# Patient Record
Sex: FEMALE | Race: WHITE | NOT HISPANIC OR LATINO | Employment: PART TIME | URBAN - METROPOLITAN AREA
[De-identification: names, ages, dates, MRNs, and addresses within clinical notes are randomized per-mention and may not be internally consistent; named-entity substitution may affect disease eponyms.]

---

## 2017-01-17 ENCOUNTER — GENERIC CONVERSION - ENCOUNTER (OUTPATIENT)
Dept: OTHER | Facility: OTHER | Age: 47
End: 2017-01-17

## 2017-01-17 ENCOUNTER — ALLSCRIPTS OFFICE VISIT (OUTPATIENT)
Dept: OTHER | Facility: OTHER | Age: 47
End: 2017-01-17

## 2017-01-17 LAB — S PYO AG THROAT QL: NEGATIVE

## 2017-02-06 ENCOUNTER — ALLSCRIPTS OFFICE VISIT (OUTPATIENT)
Dept: OTHER | Facility: OTHER | Age: 47
End: 2017-02-06

## 2017-02-06 DIAGNOSIS — E66.9 OBESITY: ICD-10-CM

## 2017-02-17 ENCOUNTER — LAB CONVERSION - ENCOUNTER (OUTPATIENT)
Dept: OTHER | Facility: OTHER | Age: 47
End: 2017-02-17

## 2017-02-17 LAB
CHOLEST SERPL-MCNC: 176 MG/DL (ref 125–200)
CHOLEST/HDLC SERPL: 2.6 (CALC)
HDLC SERPL-MCNC: 68 MG/DL
LDL CHOLESTEROL (HISTORICAL): 97 MG/DL (CALC)
NON-HDL-CHOL (CHOL-HDL) (HISTORICAL): 108 MG/DL (CALC)
TRIGL SERPL-MCNC: 57 MG/DL

## 2017-02-18 ENCOUNTER — LAB CONVERSION - ENCOUNTER (OUTPATIENT)
Dept: OTHER | Facility: OTHER | Age: 47
End: 2017-02-18

## 2017-02-18 LAB
25(OH)D3 SERPL-MCNC: 23 NG/ML (ref 30–100)
A/G RATIO (HISTORICAL): 1.7 (CALC) (ref 1–2.5)
ALBUMIN SERPL BCP-MCNC: 4.2 G/DL (ref 3.6–5.1)
ALP SERPL-CCNC: 43 U/L (ref 33–115)
ALT SERPL W P-5'-P-CCNC: 14 U/L (ref 6–29)
AST SERPL W P-5'-P-CCNC: 14 U/L (ref 10–35)
BACTERIA UR QL AUTO: ABNORMAL /HPF
BASOPHILS # BLD AUTO: 0.4 %
BASOPHILS # BLD AUTO: 23 CELLS/UL (ref 0–200)
BILIRUB SERPL-MCNC: 0.8 MG/DL (ref 0.2–1.2)
BILIRUB UR QL STRIP: NEGATIVE
BUN SERPL-MCNC: 16 MG/DL (ref 7–25)
BUN/CREA RATIO (HISTORICAL): NORMAL (CALC) (ref 6–22)
CALCIUM SERPL-MCNC: 8.9 MG/DL (ref 8.6–10.2)
CHLORIDE SERPL-SCNC: 105 MMOL/L (ref 98–110)
CHOLEST SERPL-MCNC: 176 MG/DL (ref 125–200)
CHOLEST/HDLC SERPL: 2.6 (CALC)
CO2 SERPL-SCNC: 25 MMOL/L (ref 20–31)
COLOR UR: YELLOW
COMMENT (HISTORICAL): ABNORMAL
CREAT SERPL-MCNC: 1.02 MG/DL (ref 0.5–1.1)
DEPRECATED RDW RBC AUTO: 14.5 % (ref 11–15)
EGFR AFRICAN AMERICAN (HISTORICAL): 75 ML/MIN/1.73M2
EGFR-AMERICAN CALC (HISTORICAL): 65 ML/MIN/1.73M2
EOSINOPHIL # BLD AUTO: 1.2 %
EOSINOPHIL # BLD AUTO: 68 CELLS/UL (ref 15–500)
FECAL OCCULT BLOOD DIAGNOSTIC (HISTORICAL): NEGATIVE
GAMMA GLOBULIN (HISTORICAL): 2.5 G/DL (CALC) (ref 1.9–3.7)
GLUCOSE (HISTORICAL): 85 MG/DL (ref 65–99)
GLUCOSE (HISTORICAL): NEGATIVE
HCT VFR BLD AUTO: 39.8 % (ref 35–45)
HDLC SERPL-MCNC: 68 MG/DL
HGB BLD-MCNC: 13.1 G/DL (ref 11.7–15.5)
HYALINE CASTS #/AREA URNS LPF: ABNORMAL /LPF
KETONES UR STRIP-MCNC: NEGATIVE MG/DL
LDL CHOLESTEROL (HISTORICAL): 97 MG/DL (CALC)
LEUKOCYTE ESTERASE UR QL STRIP: ABNORMAL
LYMPHOCYTES # BLD AUTO: 1676 CELLS/UL (ref 850–3900)
LYMPHOCYTES # BLD AUTO: 29.4 %
MCH RBC QN AUTO: 29.6 PG (ref 27–33)
MCHC RBC AUTO-ENTMCNC: 32.8 G/DL (ref 32–36)
MCV RBC AUTO: 90.2 FL (ref 80–100)
MONOCYTES # BLD AUTO: 399 CELLS/UL (ref 200–950)
MONOCYTES (HISTORICAL): 7 %
NEUTROPHILS # BLD AUTO: 3534 CELLS/UL (ref 1500–7800)
NEUTROPHILS # BLD AUTO: 62 %
NITRITE UR QL STRIP: NEGATIVE
NON-HDL-CHOL (CHOL-HDL) (HISTORICAL): 108 MG/DL (CALC)
PH UR STRIP.AUTO: 6 [PH] (ref 5–8)
PLATELET # BLD AUTO: 209 THOUSAND/UL (ref 140–400)
PMV BLD AUTO: 9.8 FL (ref 7.5–12.5)
POTASSIUM SERPL-SCNC: 4.4 MMOL/L (ref 3.5–5.3)
PROT UR STRIP-MCNC: NEGATIVE MG/DL
RBC # BLD AUTO: 4.41 MILLION/UL (ref 3.8–5.1)
RBC (HISTORICAL): ABNORMAL /HPF
SODIUM SERPL-SCNC: 137 MMOL/L (ref 135–146)
SP GR UR STRIP.AUTO: 1.03 (ref 1–1.03)
SQUAMOUS EPITHELIAL CELLS (HISTORICAL): ABNORMAL /HPF
TOTAL PROTEIN (HISTORICAL): 6.7 G/DL (ref 6.1–8.1)
TRIGL SERPL-MCNC: 57 MG/DL
TSH SERPL DL<=0.05 MIU/L-ACNC: 2.69 MIU/L
WBC # BLD AUTO: 5.7 THOUSAND/UL (ref 3.8–10.8)
WBC # BLD AUTO: ABNORMAL /HPF

## 2017-08-15 ENCOUNTER — ALLSCRIPTS OFFICE VISIT (OUTPATIENT)
Dept: OTHER | Facility: OTHER | Age: 47
End: 2017-08-15

## 2017-08-15 ENCOUNTER — APPOINTMENT (OUTPATIENT)
Dept: LAB | Age: 47
End: 2017-08-15
Payer: COMMERCIAL

## 2017-08-15 ENCOUNTER — TRANSCRIBE ORDERS (OUTPATIENT)
Dept: ADMINISTRATIVE | Age: 47
End: 2017-08-15

## 2017-08-15 DIAGNOSIS — Z02.1 ENCOUNTER FOR PRE-EMPLOYMENT EXAMINATION: ICD-10-CM

## 2017-08-15 DIAGNOSIS — Z13.220 ENCOUNTER FOR SCREENING FOR LIPOID DISORDERS: ICD-10-CM

## 2017-08-15 DIAGNOSIS — E55.9 VITAMIN D DEFICIENCY: ICD-10-CM

## 2017-08-15 PROCEDURE — 86480 TB TEST CELL IMMUN MEASURE: CPT

## 2017-08-15 PROCEDURE — 36415 COLL VENOUS BLD VENIPUNCTURE: CPT

## 2017-08-17 LAB
ANNOTATION COMMENT IMP: NORMAL
GAMMA INTERFERON BACKGROUND BLD IA-ACNC: 0.03 IU/ML
M TB IFN-G BLD-IMP: NEGATIVE
M TB IFN-G CD4+ BCKGRND COR BLD-ACNC: 0 IU/ML
M TB IFN-G CD4+ T-CELLS BLD-ACNC: 0.03 IU/ML
MITOGEN IGNF BLD-ACNC: >10 IU/ML
QUANTIFERON-TB GOLD IN TUBE: NORMAL
SERVICE CMNT-IMP: NORMAL

## 2017-08-18 ENCOUNTER — GENERIC CONVERSION - ENCOUNTER (OUTPATIENT)
Dept: OTHER | Facility: OTHER | Age: 47
End: 2017-08-18

## 2018-01-13 NOTE — MISCELLANEOUS
Message   Date: 18 Aug 2017 12:26 PM EST, Recorded By: Sawyer Harrington For: Neena Mcghee left on [phone]   that employment physical form is completed and ready for  in the [facility] office  Active Problems    1  Encounter for physical examination related to employment (V70 5) (Z02 1)   2  Lipid screening (V77 91) (Z13 220)   3  Obese (278 00) (E66 9)   4  Seasonal allergies (477 9) (J30 2)   5  Vitamin D deficiency (268 9) (E55 9)    Current Meds   1  Probiotic CAPS; TAKE AS DIRECTED; Therapy: (Recorded:05Coj2591) to Recorded   2  Vitamin D3 400 UNIT/ML Oral Liquid; Therapy: (Recorded:97Sdr7748) to Recorded    Allergies    1  Penicillins    2   Seasonal    Signatures   Electronically signed by : Shanta Meneses RN; Aug 18 2017 12:28PM EST                       (Author)

## 2018-01-13 NOTE — PROGRESS NOTES
Assessment   1  Encounter for preventive health examination (V70 0) (Z00 00)  2  Seasonal allergies (477 9) (J30 2)  3  Obese (278 00) (E66 9)    Plan  Obese    · (1) CBC/PLT/DIFF; Status:Active; Requested for:35Ofk4947;    · (1) COMPREHENSIVE METABOLIC PANEL; Status:Active; Requested for:40Vfh0587;    · (1) LIPID PANEL FASTING W DIRECT LDL REFLEX; Status:Active; Requested  for:96Sdy1732;    · (1) TSH; Status:Active; Requested for:61Bzj4016;    · (1) URINALYSIS (will reflex a microscopy if leukocytes, occult blood, protein or nitrites  are not within normal limits); Status:Active; Requested for:06Feb2017;    · (1) VITAMIN D 25-HYDROXY; Status:Active; Requested TOD:46QVV9122;   Seasonal allergies    · Avoid exposure to cigarette smoke ; Status:Complete;   Done: 14UFD3040   · Avoid exposure to things that make your problem worse ; Status:Complete;   Done:  40TYS6782   · Drink at least 6 glasses of water or juice a day ; Status:Complete;   Done: 74LQZ5590   · Good hand washing is one of the best ways to control the spread of germs ;  Status:Complete;   Done: 43LFR0651   · How to use a nasal spray:; Status:Complete;   Done: 40UKI3768   · How to use saline nose drops:; Status:Complete;   Done: 95BXF8892   · Taking a hot steamy shower may help your condition ; Status:Complete;   Done:  49JRY7920   · Use a cool mist humidifier in the room ; Status:Complete;   Done: 13IXJ7771   · You may slowly resume your normal level of activity once you feel better ;  Status:Complete;   Done: 60QMW3449   · Call [phone] if: Breathing starts to have a wheeze or whistling sound ;  Status:Complete;   Done: 40RNT3819   · Call [phone] if: The symptoms are not better in 7 days ; Status:Complete;   Done:  83WYW9978   · Call [phone] if: You have a severe headache that will not go away ;  Status:Complete;   Done: 43AUD6613   · Call [phone] if: You have difficulty breathing, or you are short of breath more  often  ; Status:Complete;   Done: 11JCF5705   · Call [phone] if: You have pain in your ear ; Status:Complete;   Done: 54XSM7781   · Call [phone] if: You have pain in your face, cheeks or forehead ;  Status:Complete;   Done: 87TZO5431   · Call [phone] if: You have yellow or green nasal discharge ; Status:Complete;    Done: 21BUU0528   · Call [phone] if: Your temperature is higher than 101F ; Status:Complete;   Done:  30CRD7815    Discussion/Summary  health maintenance visit Currently, she eats a healthy diet  the risks and benefits of cervical cancer screening were discussed Breast cancer screening: the risks and benefits of breast cancer screening were discussed  Colorectal cancer screening: the risks and benefits of colorectal cancer screening were discussed  Osteoporosis screening: the risks and benefits of osteoporosis screening were discussed  Screening lab work includes hemoglobin, lipid profile, thyroid function testing, 25-hydroxyvitamin D and urinalysis  The risks and benefits of immunizations were discussed  Advice and education were given regarding nutrition, aerobic exercise, weight bearing exercise, weight loss, calcium supplements, vitamin D supplements, reproductive health, tobacco cessation, alcohol use, sunscreen use, self skin examination, helmet use and seat belt use  Patient discussion: discussed with the patient  1) Seasonal allergies: Start an over the counter antihistamine- Zyrtec (cetirizine) or Alavert (loratidine) for your seasonal allergies  2) Obesity: Keep with diet and exercise  You're doing great! Go for labs  Will follow up on results  Follow up in 6 months or sooner if need be  Patient is able to Self-Care  Possible side effects of new medications were reviewed with the patient/guardian today  The treatment plan was reviewed with the patient/guardian   The patient/guardian understands and agrees with the treatment plan   The patient was counseled regarding diagnostic results, instructions for management, prognosis, patient and family education, risks and benefits of treatment options  Chief Complaint  Patient is here for a Health Maintenance Exam       History of Present Illness  HM, Adult Female: The patient is being seen for a health maintenance evaluation  The last health maintenance visit was 1 year(s) ago  Social History: Household members include roommates  She is unmarried  Work status: working part-time and occupation: teacher  The patient has never smoked cigarettes and has never used smokeless tobacco  She reports rare alcohol use  She has never used illicit drugs  General Health: The patient's health since the last visit is described as good  She has regular dental visits  The patient brushes 2 time(s) a day and reports her last dental visit was 6 months ago  Dental problems: no tooth pain  She complains of vision problems  Vision care includes wearing glasses, having regular eye examinations, having eye examinations 1 times per year and an eye examination within the last year  She denies hearing loss  Immunizations status: up to date  Lifestyle:  She consumes a diverse and healthy diet  She has weight concerns  Weight control issues: serious weight loss attempts  She exercises regularly  She exercises less than three times a week  Exercise includes walking and biking  She does not use tobacco  She denies alcohol use  She denies drug use  Reproductive health: the patient is premenopausal   she reports normal menses  she uses no contraception  she is not sexually active  she denies prior pregnancies  Screening: Cervical cancer screening includes a pap smear performed November 2016  Breast cancer screening includes a mammogram performed December 2016, a clinical breast exam performed December 2016 and irregular breast self-exams performed  She hasn't been previously screened for colorectal cancer     Metabolic screening includes uncertain timing of her last lipid profile, uncertain timing of her last glucose screening, uncertain timing of her last thyroid function test and no previous DEXA  Cardiovascular risk factors: no hypertension, no diabetes, no tobacco use, no illicit drug use and no sedentary lifestyle  General health risks: no previous breast cancer, no abnormal cervical cytology, no positive screening for human papilloma virus, no hormone therapy, no diethylstilbestrol (MAI) exposure in utero, no previous colon polyps, no inflammatory bowel disease, no osteoporosis risk factors, no asbestos exposure and no radiation exposure  Safety elements used: seat belt, safe driving habits, sunscreen and smoke detector, but no bicycle helmet, no hot water temperature less than 120 degrees F and no bathroom grab bars  Risk findings: no passive smoke exposure, no unsafe sexual behavior, no chemical abuse, no domestic violence and no guns at home  HPI: Pt is a 44yo female presenting to the office to establish care  No significant PMH  Review of Systems    Constitutional: No fever, no chills, feels well, no tiredness, no recent weight gain or weight loss  Eyes: No complaints of eye pain, no red eyes, no eyesight problems, no discharge, no dry eyes, no itching of eyes  ENT: no complaints of earache, no loss of hearing, no nose bleeds, no nasal discharge, no sore throat, no hoarseness  Cardiovascular: No complaints of slow heart rate, no fast heart rate, no chest pain, no palpitations, no leg claudication, no lower extremity edema  Respiratory: No complaints of shortness of breath, no wheezing, no cough, no SOB on exertion, no orthopnea, no PND  Gastrointestinal: No complaints of abdominal pain, no constipation, no nausea or vomiting, no diarrhea, no bloody stools  Genitourinary: No complaints of dysuria, no incontinence, no pelvic pain, no dysmenorrhea, no vaginal discharge or bleeding     Musculoskeletal: No complaints of arthralgias, no myalgias, no joint swelling or stiffness, no limb pain or swelling  Integumentary: No complaints of skin rash or lesions, no itching, no skin wounds, no breast pain or lump  Neurological: headache and menstrual headaches, but no numbness, no tingling, no dizziness and no fainting  Psychiatric: Not suicidal, no sleep disturbance, no anxiety or depression, no change in personality, no emotional problems  Endocrine: No complaints of proptosis, no hot flashes, no muscle weakness, no deepening of the voice, no feelings of weakness  Hematologic/Lymphatic: No complaints of swollen glands, no swollen glands in the neck, does not bleed easily, does not bruise easily  Over the past 2 weeks, how often have you been bothered by the following problems? 1 ) Little interest or pleasure in doing things? Not at all    2 ) Feeling down, depressed or hopeless? Not at all    3 ) Trouble falling asleep or sleeping too much? Not at all    4 ) Feeling tired or having little energy? Several days  5 ) Poor appetite or overeating? Not at all    6 ) Feeling bad about yourself, or that you are a failure, or have let yourself or your family down? Not at all    7 ) Trouble concentrating on things, such as reading a newspaper or watching television? Not at all    8 ) Moving or speaking so slowly that other people could have noticed, or the opposite, moving or speaking faster than usual? Not at all    9 ) Thoughts that you would be better off dead or of hurting yourself in some way? Not at all  Score 1     ROS reviewed        Past Medical History    · History of herpes zoster (V12 09) (Z86 19)   · History of Nasopharyngitis acute (460) (J00)   · History of No significant past medical history   · History of Nulligravida (V49 89) (Z78 9)    Surgical History    · History of Appendectomy   · History of Oral Surgery Tooth Extraction   · History of Tonsillectomy    Family History  Mother    · Family history of cardiac disorder (V17 49) (Z82 49)   · Family history of hypertension (V17 49) (Z82 49)   · Family history of malignant neoplasm of breast (V16 3) (Z80 3)   · FH: ovarian cancer (V16 41) (Z80 41)  Father    · Family history of cardiac disorder (V17 49) (Z82 49)   · Family history of hypertension (V17 49) (Z82 49)   · FH: stroke (V17 1) (Z82 3)  Maternal Grandmother    · Family history of malignant neoplasm of breast (V16 3) (Z80 3)  Maternal Aunt    · Family history of malignant neoplasm of breast (V16 3) (Z80 3)    Social History    · Never a smoker   · No alcohol use   · No illicit drug use    Current Meds  1  No Reported Medications Recorded    Allergies   1  Penicillins    Vitals   Recorded: 04BAO1142 04:10PM   Temperature 98 1 F, Oral   Heart Rate 79   Systolic 305, LUE, Sitting   Diastolic 86, LUE, Sitting   Height 5 ft 3 in   Weight 183 lb    BMI Calculated 32 42   BSA Calculated 1 86   O2 Saturation 98     Physical Exam    Constitutional   General appearance: No acute distress, well appearing and well nourished  Head and Face   Head and face: Normal     Palpation of the face and sinuses: No sinus tenderness  Eyes   Conjunctiva and lids: No swelling, erythema or discharge  Pupils and irises: Equal, round, reactive to light  Ears, Nose, Mouth, and Throat   External inspection of ears and nose: Normal     Otoscopic examination: Tympanic membranes translucent with normal light reflex  Canals patent without erythema  Hearing: Normal     Nasal mucosa, septum, and turbinates: Normal without edema or erythema  Lips, teeth, and gums: Normal, good dentition  Oropharynx: Normal with no erythema, edema, exudate or lesions  Neck   Neck: Supple, symmetric, trachea midline, no masses  Thyroid: Normal, no thyromegaly  Pulmonary   Respiratory effort: No increased work of breathing or signs of respiratory distress      Percussion of chest: Normal     Palpation of chest: Normal     Auscultation of lungs: Clear to auscultation  Cardiovascular   Palpation of heart: Normal PMI, no thrills  Auscultation of heart: Normal rate and rhythm, normal S1 and S2, no murmurs  Examination of extremities for edema and/or varicosities: Normal     Abdomen   Abdomen: Non-tender, no masses  The abdomen was rounded  Bowel sounds were normal  The abdomen was soft and nontender  Lymphatic   Palpation of lymph nodes in neck: No lymphadenopathy  Musculoskeletal   Gait and station: Normal     Digits and nails: Normal without clubbing or cyanosis  Joints, bones, and muscles: Normal     Skin   Skin and subcutaneous tissue: Normal without rashes or lesions  Palpation of skin and subcutaneous tissue: Normal turgor  Psychiatric   Judgment and insight: Normal     Orientation to person, place, and time: Normal     Recent and remote memory: Intact  Mood and affect: Normal        Health Management  History of Encounter for gynecological examination without abnormal finding   (B) PAP WITH HPV PLUS; every 3 years; Last 35FDX0954; Next Due: 76VIM8996; Active  History of Screening for HPV (human papillomavirus)   (B) PAP WITH HPV PLUS; every 3 years; Last 94QGZ7697; Next Due: 99MNP0670; Active    Signatures   Electronically signed by : Robert Treadwell, [facility]; Feb 6 2017  5:45PM EST                       (Author)    Electronically signed by :  Steff Metz MD; Feb 7 2017  9:25AM EST                       (Author)

## 2018-01-14 VITALS
BODY MASS INDEX: 32.43 KG/M2 | HEART RATE: 79 BPM | OXYGEN SATURATION: 98 % | DIASTOLIC BLOOD PRESSURE: 86 MMHG | HEIGHT: 63 IN | SYSTOLIC BLOOD PRESSURE: 120 MMHG | TEMPERATURE: 98.1 F | WEIGHT: 183 LBS

## 2018-01-15 VITALS
BODY MASS INDEX: 31.94 KG/M2 | WEIGHT: 180.25 LBS | TEMPERATURE: 98.7 F | DIASTOLIC BLOOD PRESSURE: 72 MMHG | HEIGHT: 63 IN | HEART RATE: 89 BPM | OXYGEN SATURATION: 97 % | SYSTOLIC BLOOD PRESSURE: 118 MMHG

## 2018-01-17 NOTE — PROGRESS NOTES
Assessment   1  Encounter for physical examination related to employment (V70 5) (Z02 1)  2  Lipid screening (V77 91) (Z13 220)  3  Vitamin D deficiency (268 9) (E55 9)    Plan  Encounter for physical examination related to employment    · Follow-up visit in 1 year Evaluation and Treatment  Follow-up  Status: Hold For -  Scheduling  Requested for: [id number]   · Be sure to get at least 8 hours of sleep every night ; Status:Complete;   Done: 55VIA6144  03:30PM   · Eat small frequent meals ; Status:Complete;   Done: 35JTK8925 03:30PM   · Regular aerobic exercise can help reduce stress ; Status:Complete;   Done: 53ESN3937  03:30PM   · Some eating tips that can help you lose weight ; Status:Complete;   Done: 95FHS1632  03:30PM   · There are many exercise options for seniors ; Status:Complete;   Done: 57SDQ7286  03:30PM   · We recommend that you bring your body mass index down to 26 ; Status:Complete;    Done: 05DPX9041 03:30PM   · (1) QUANTIFERON - TB GOLD; Status:Active; Requested for:24Tuo8869;   Encounter for physical examination related to employment, Lipid screening    · (1) LIPID PANEL, FASTING; Status:Active; Requested for:34Nvk9730;   Encounter for physical examination related to employment, Lipid screening, Vitamin D  deficiency    · (1) CBC/ PLT (NO DIFF); Status:Active; Requested for:23Ouo7518;    · (1) COMPREHENSIVE METABOLIC PANEL; Status:Active; Requested for:48Dyj2085; Health Maintenance    · SNELLEN VISION- POC; Status:Complete - Retrospective By Protocol Authorization;    Done: 74KPY2374 03:11PM  Lipid screening, Vitamin D deficiency    · (1) VITAMIN D 25-HYDROXY; Status:Active; Requested for:36Pjs1809;     Discussion/Summary  health maintenance visit Currently, she eats an adequate diet   the risks and benefits of cervical cancer screening were discussed cervical cancer screening is current Breast cancer screening: the risks and benefits of breast cancer screening were discussed and mammogram is current  Colorectal cancer screening: the risks and benefits of colorectal cancer screening were discussed and colorectal cancer screening is not indicated  Osteoporosis screening: bone mineral density testing is not indicated  Screening lab work includes lipid profile and 25-hydroxyvitamin D  The risks and benefits of immunizations were discussed  Advice and education were given regarding nutrition, weight loss, calcium supplements, vitamin D supplements, contraception, alcohol use and seat belt use  Patient discussion: discussed with the patient  50 y o  female:   1) Annual health maintenance: No active issues  Will check CBC, CMP, lipid panel, and Vit D level  Will also check quantiferon gold test as per employment requirements  No restrictions for employment  Employment exam form completed  The patient was counseled regarding diagnostic results, instructions for management, risk factor reductions, prognosis, patient and family education, impressions, risks and benefits of treatment options, importance of compliance with treatment  Educational resources provided:   Possible side effects of new medications were reviewed with the patient/guardian today  The treatment plan was reviewed with the patient/guardian  The patient/guardian understands and agrees with the treatment plan      Chief Complaint  Patient is here today for a pre-employment physical for [employer]  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  History of Present Illness  HM, Adult Female: The patient is being seen for a health maintenance evaluation  The last health maintenance visit was 1 year(s) ago  Social History: She is unmarried  Work status: working part-time  The patient has never smoked cigarettes  She reports rare alcohol use  She has never used illicit drugs  General Health: The patient's health since the last visit is described as good  She has regular dental visits  She denies vision problems  She denies hearing loss  Immunizations status: up to date  Lifestyle:  She consumes a diverse and healthy diet  She has weight concerns  She exercises regularly  She does not use tobacco  She consumes alcohol  She denies drug use  Reproductive health: the patient is premenopausal    Screening:   HPI: 50 y o  female is seen today for annual examination  She also need employment physical exam completed  She has no active complaints  Review of Systems    Constitutional: no fever, no recent weight gain, no chills and no recent weight loss  Eyes: no eye pain, no dryness of the eyes, eyes not red and no itching of the eyes  ENT: no earache and no sore throat  Cardiovascular: no chest pain, no intermittent leg claudication, no palpitations and no lower extremity edema  Respiratory: no shortness of breath, no cough, no orthopnea, no wheezing, no shortness of breath during exertion and no PND  Gastrointestinal: no abdominal pain, no nausea, no vomiting, no constipation, no diarrhea and no blood in stools  Genitourinary: no dysuria and no incontinence  Musculoskeletal: no arthralgias and no myalgias  Integumentary: no rashes and no skin lesions  Neurological: no headache, no numbness, no confusion and no dizziness  Psychiatric: no anxiety and no depression  Endocrine: no muscle weakness  Hematologic/Lymphatic: no tendency for easy bleeding and no tendency for easy bruising  ROS reviewed  Active Problems   1  Obese (278 00) (E66 9)  2   Seasonal allergies (477 9) (J30 2)    Past Medical History    · History of Bronchospasm (519 11) (J98 01)   · History of herpes zoster (V12 09) (Z86 19)   · History of low back pain (V13 59) (Z87 39)   · History of premenstrual syndrome (V13 29) (Z87 42)   · History of wheezing (V12 69) (I87 118)   · History of Nasopharyngitis acute (460) (J00)   · History of Nulligravida (V49 89)    Surgical History    · History of Appendectomy   · History of Oral Surgery Tooth Extraction   · History of Tonsillectomy    Family History  Mother    · Family history of cardiac disorder (V17 49) (Z82 49)   · Family history of hypertension (V17 49) (Z82 49)   · Family history of malignant neoplasm of breast (V16 3) (Z80 3)   · Family history of ovarian cancer (V16 41) (Z80 41)  Father    · Family history of cardiac disorder (V17 49) (Z82 49)   · Family history of hypertension (V17 49) (Z82 49)   · Family history of stroke (V17 1) (Z82 3)  Maternal Grandmother    · Family history of malignant neoplasm of breast (V16 3) (Z80 3)  Maternal Aunt    · Family history of malignant neoplasm of breast (V16 3) (Z80 3)    Social History    · Never a smoker   · No alcohol use   · No illicit drug use    Current Meds  1  Probiotic CAPS; TAKE AS DIRECTED; Therapy: (Recorded:84Egz9060) to Recorded  2  Vitamin D3 400 UNIT/ML Oral Liquid; Therapy: (Recorded:76Gdl8884) to Recorded    Allergies   1  Penicillins   2  Seasonal    Vitals   Recorded: 15Pgu5338 03:05PM   Temperature 99 F, Tympanic   Heart Rate 76, L Radial   Pulse Quality Regular, L Radial   Respiration 14   Systolic 577, LUE, Sitting   Diastolic 82, LUE, Sitting   Height 5 ft 2 76 in   Weight 179 lb 12 8 oz   BMI Calculated 32 1   BSA Calculated 1 84   O2 Saturation 98, RA     Physical Exam    Constitutional   General appearance: No acute distress, well appearing and well nourished  Head and Face   Head and face: Normal     Eyes   Conjunctiva and lids: No swelling, erythema or discharge  Ears, Nose, Mouth, and Throat   External inspection of ears and nose: Normal     Otoscopic examination: Tympanic membranes translucent with normal light reflex  Canals patent without erythema  Hearing: Normal     Nasal mucosa, septum, and turbinates: Normal without edema or erythema  Lips, teeth, and gums: Normal, good dentition  Oropharynx: Normal with no erythema, edema, exudate or lesions      Neck   Neck: Supple, symmetric, trachea midline, no masses  Thyroid: Normal, no thyromegaly  Pulmonary   Respiratory effort: No increased work of breathing or signs of respiratory distress  Percussion of chest: Normal     Palpation of chest: Normal     Auscultation of lungs: Clear to auscultation  Cardiovascular   Palpation of heart: Normal PMI, no thrills  Auscultation of heart: Normal rate and rhythm, normal S1 and S2, no murmurs  Pedal pulses: 2+ bilaterally  Examination of extremities for edema and/or varicosities: Normal     Abdomen   Abdomen: Non-tender, no masses  Liver and spleen: No hepatomegaly or splenomegaly  Lymphatic   Palpation of lymph nodes in neck: No lymphadenopathy  Musculoskeletal   Gait and station: Normal     Digits and nails: Normal without clubbing or cyanosis  Joints, bones, and muscles: Normal     Range of motion: Normal     Stability: Normal     Muscle strength/tone: Normal     Skin   Skin and subcutaneous tissue: Normal without rashes or lesions  Palpation of skin and subcutaneous tissue: Normal turgor  Neurologic   Cranial nerves: Cranial nerves II-XII intact  Cortical function: Normal mental status  Reflexes: 2+ and symmetric  Sensation: No sensory loss  Coordination: Normal finger to nose and heel to shin  Psychiatric   Judgment and insight: Normal     Orientation to person, place, and time: Normal     Recent and remote memory: Intact      Mood and affect: Normal        Results/Data  SNELLEN VISION- POC [id number] 03:11PM Anne Garcias     Test Name Result Flag Reference   Right Eye 20/25 corrected     Left Eye 20/25 corrected     Bilateral Eyes 20/20 corrected         Procedure    Procedure: Visual Acuity Test    Results: 20/25 in both eyes without corrective device, 20/30 in the right eye without corrective device, 20/50 in the left eye without corrective device, 20/20 in both eyes with corrective device, 20/25 in the right eye with corrective device, 20/25 in the left eye with corrective device   Color vision was screened with the [facility] Test and the results were  Health Management  History of Encounter for gynecological examination without abnormal finding   (B) PAP WITH HPV PLUS; every 3 years; Last 88ETP7074; Next Due: 02MUQ8255; Active  History of Screening for HPV (human papillomavirus)   (B) PAP WITH HPV PLUS; every 3 years; Last 18XGR1801; Next Due: 20HKV0517; Active    Signatures   Electronically signed by :  James Aguirre MD; Aug 15 2017  5:10PM EST                       (Author)

## 2018-01-22 VITALS
HEART RATE: 76 BPM | TEMPERATURE: 99 F | BODY MASS INDEX: 31.86 KG/M2 | OXYGEN SATURATION: 98 % | WEIGHT: 179.8 LBS | SYSTOLIC BLOOD PRESSURE: 120 MMHG | RESPIRATION RATE: 14 BRPM | DIASTOLIC BLOOD PRESSURE: 82 MMHG | HEIGHT: 63 IN

## 2019-06-10 ENCOUNTER — TELEPHONE (OUTPATIENT)
Dept: INTERNAL MEDICINE CLINIC | Facility: CLINIC | Age: 49
End: 2019-06-10